# Patient Record
Sex: FEMALE | Race: WHITE | NOT HISPANIC OR LATINO | Employment: OTHER | URBAN - METROPOLITAN AREA
[De-identification: names, ages, dates, MRNs, and addresses within clinical notes are randomized per-mention and may not be internally consistent; named-entity substitution may affect disease eponyms.]

---

## 2019-08-14 DIAGNOSIS — K21.9 GASTROESOPHAGEAL REFLUX DISEASE, ESOPHAGITIS PRESENCE NOT SPECIFIED: ICD-10-CM

## 2019-08-14 DIAGNOSIS — R49.9 VOICE IMPAIRMENT: Primary | ICD-10-CM

## 2019-10-03 ENCOUNTER — CLINICAL SUPPORT (OUTPATIENT)
Dept: SPEECH THERAPY | Facility: HOSPITAL | Age: 64
End: 2019-10-03
Payer: COMMERCIAL

## 2019-10-03 ENCOUNTER — OFFICE VISIT (OUTPATIENT)
Dept: OTOLARYNGOLOGY | Facility: CLINIC | Age: 64
End: 2019-10-03
Payer: COMMERCIAL

## 2019-10-03 VITALS
DIASTOLIC BLOOD PRESSURE: 93 MMHG | WEIGHT: 210.13 LBS | HEART RATE: 59 BPM | TEMPERATURE: 98 F | SYSTOLIC BLOOD PRESSURE: 135 MMHG

## 2019-10-03 DIAGNOSIS — J38.5 LARYNGEAL SPASM: ICD-10-CM

## 2019-10-03 DIAGNOSIS — J38.3 VOCAL FOLD SCAR: ICD-10-CM

## 2019-10-03 DIAGNOSIS — R49.0 DYSPHONIA: Primary | ICD-10-CM

## 2019-10-03 DIAGNOSIS — K21.9 GASTROESOPHAGEAL REFLUX DISEASE, ESOPHAGITIS PRESENCE NOT SPECIFIED: ICD-10-CM

## 2019-10-03 DIAGNOSIS — R49.9 VOICE IMPAIRMENT: ICD-10-CM

## 2019-10-03 PROCEDURE — 31579 LARYNGOSCOPY TELESCOPIC: CPT | Mod: S$GLB,,, | Performed by: OTOLARYNGOLOGY

## 2019-10-03 PROCEDURE — 92524 BEHAVRAL QUALIT ANALYS VOICE: CPT | Mod: GN

## 2019-10-03 PROCEDURE — 99999 PR PBB SHADOW E&M-EST. PATIENT-LVL III: CPT | Mod: PBBFAC,,, | Performed by: OTOLARYNGOLOGY

## 2019-10-03 PROCEDURE — 31579 PR LARYNGOSCOPY, FLEX/RIGID TELESCOPIC, W/STROBOSCOPY: ICD-10-PCS | Mod: S$GLB,,, | Performed by: OTOLARYNGOLOGY

## 2019-10-03 PROCEDURE — 99203 OFFICE O/P NEW LOW 30 MIN: CPT | Mod: 25,S$GLB,, | Performed by: OTOLARYNGOLOGY

## 2019-10-03 PROCEDURE — 99203 PR OFFICE/OUTPT VISIT, NEW, LEVL III, 30-44 MIN: ICD-10-PCS | Mod: 25,S$GLB,, | Performed by: OTOLARYNGOLOGY

## 2019-10-03 PROCEDURE — 99999 PR PBB SHADOW E&M-EST. PATIENT-LVL III: ICD-10-PCS | Mod: PBBFAC,,, | Performed by: OTOLARYNGOLOGY

## 2019-10-03 RX ORDER — ESCITALOPRAM OXALATE 20 MG/1
20 TABLET ORAL DAILY
COMMUNITY

## 2019-10-03 RX ORDER — METOPROLOL SUCCINATE 25 MG/1
25 TABLET, EXTENDED RELEASE ORAL DAILY
COMMUNITY

## 2019-10-03 RX ORDER — CETIRIZINE HYDROCHLORIDE 10 MG/1
10 TABLET, CHEWABLE ORAL DAILY
COMMUNITY

## 2019-10-03 RX ORDER — CYANOCOBALAMIN (VITAMIN B-12) 2500 MCG
TABLET, SUBLINGUAL SUBLINGUAL
COMMUNITY

## 2019-10-03 RX ORDER — ASPIRIN 81 MG/1
81 TABLET ORAL DAILY
COMMUNITY

## 2019-10-03 RX ORDER — TRAZODONE HYDROCHLORIDE 50 MG/1
50 TABLET ORAL NIGHTLY
COMMUNITY

## 2019-10-03 RX ORDER — BACLOFEN 20 MG/1
20 TABLET ORAL 3 TIMES DAILY
COMMUNITY

## 2019-10-03 RX ORDER — GABAPENTIN 300 MG/1
300 CAPSULE ORAL 3 TIMES DAILY
COMMUNITY

## 2019-10-03 RX ORDER — MONTELUKAST SODIUM 10 MG/1
10 TABLET ORAL NIGHTLY
COMMUNITY

## 2019-10-03 RX ORDER — OXYCODONE AND ACETAMINOPHEN 7.5; 325 MG/1; MG/1
1 TABLET ORAL EVERY 4 HOURS PRN
COMMUNITY

## 2019-10-03 RX ORDER — LEVOTHYROXINE SODIUM 112 UG/1
112 TABLET ORAL DAILY
COMMUNITY

## 2019-10-03 RX ORDER — MECLIZINE HCL 12.5 MG 12.5 MG/1
12.5 TABLET ORAL 3 TIMES DAILY PRN
COMMUNITY

## 2019-10-03 RX ORDER — PRAVASTATIN SODIUM 40 MG/1
40 TABLET ORAL DAILY
COMMUNITY

## 2019-10-03 RX ORDER — OMEPRAZOLE 40 MG/1
40 CAPSULE, DELAYED RELEASE ORAL DAILY
COMMUNITY

## 2019-10-03 RX ORDER — NAPROXEN 500 MG/1
500 TABLET ORAL 2 TIMES DAILY
COMMUNITY

## 2019-10-03 NOTE — PATIENT INSTRUCTIONS
SULCUS   What is sulcus?   Sulcus literally means groove. A vocal fold sulcus is a grooce that forms in the vocal fold tissue. In some cases, the groove is very shallow and does not cause any major symptoms. True sulcus vocalis is a deep pit in the vocal fold tissue that can severely impair the voice. Common complaints include voice weakness, fatigue, and a jason or veiled sound.   Some cases of sulci may be caused by a ruptured cyst, while others may have been present from birth.     How is it treated?   Although voice therapy may improve some symptoms, it may not resolve the problem completely. In these cases, surgery may be considered.         MUSCLE TENSION DYSPHONIA     What is MTD?     Muscle tension dysphonia (MTD) is a condition in which laryngeal muscles are overactive, causing voice fatigue and discomfort with use. Sometimes MTD accompanies another organic condition, but it can also occur on its own. The overactivity of the muscles can result in incomplete closure of the vocal folds. Sometimes, the false vocal folds are overactive as well.     MTD is caused by over-activity in some laryngeal muscles, which is sometimes caused by the underactivity of other muscles. If there is an underlying condition, like nodules or an upper respiratory infection, the overcompensation to produce voice becomes a learned pattern.     How is MTD treated?     Voice therapy is the first line of treatment for MTD. There are a wide variety of treatment approaches, including laryngeal massage.                 WHAT TO EXPECT FROM VOICE THERAPY    Purpose  The purpose of voice therapy is to help you find a better, more efficient way to use your voice or to reduce symptoms such as coughing, throat clearing, or difficulty breathing.  Depending on your symptoms, you may learn how to produce clearer voice quality, how to reduce fatigue or pain associated with speaking, how to take care of your voice, and how to eliminate  chronic coughing or throat clearing.      Process: Evaluation  First, you may go through some initial testing.  In most cases, a videostroboscopy will be performed in order to allow your speech pathologist and your physician to look at your vocal cords to aid in deciding if you would benefit from voice therapy.  Next, you may work with the speech pathologist to assess the current capabilities of your voice.  Following evaluation, your speech pathologist will design a therapeutic plan to improve your voice as well as other symptoms that may bother you.  At the time of evaluation, your speech pathologist may provide you with exercises to try at home.      Process: Therapy  Most patients benefit from 2-8 sessions over 1-3 months.  Voice therapy involves changing the behavior of your vocal cords and speaking habits, so it is very important that you attend your appointments and do home practices as instructed by your speech pathologist.  Home practice and participation in therapy are critical to meeting your desired voice goals, so of course, we are able to work with you to schedule appointments that are convenient for you.          VOCAL HYGIENE AND HYDRATION RECOMMENDATIONS     DO   · Drink plenty of waterabout  oz a day! If your urine is pale, you should be well-hydrated.  Try some of these tips to increase hydration as well.  · Eat wet snacks such as grapes, melon, cucumbers, apple slices   · Reduce intake coffee and other caffeinated and carbonated beverages   · Suck on pastille lozenges or sugar free candies (not mentholated lozenges)   · Use a room or personal humidifier   · Use sinus rinses/irrigations or nasal saline spray   · Inhale steam for a few minutes before speaking or singing   · Pay attention to how, and how much, you use your voice.   · Give yourself vocal breaks throughout the day.   · Breathe when talking, take frequent pauses for breath.   · Use a microphone when speaking in front of a  group of 15 or more to reduce voice strain.   · Learn how to use your voice correctly to get loud with voice therapy techniques.  · Stay healthy. Eat right and get plenty of rest. Follow a reflux precaution diet if indicated.   ____________________________________________________________________    REDUCE   · Loud talking, yelling, cheering, or screaming. Voice injury can take place over a long time, or all at once.  If you need to talk loud or yell, be sure you are doing it properly.   · Clearing your throat and forceful coughing. The vocal folds collect mucus when irritated or inflamed and this can cause the urge to clear your throat. The trauma of clearing the throat creates more inflammation and mucus. See tips above for keeping hydrated to assist with cutting back on throat clearing.  Instead of clearing your throat, try these behaviors until the sensation passes:   · Take a sip of water   · Silently clear with a hard exhale making an hhh sound   · Swallow hard   · Drying agents such as anti-histamines or decongestant medications. You should always take medications as prescribed, but keep in mind these will dry you out, so increase your hydration!   ____________________________________________________________________    AVOID   · Inhalant irritants such as smoke, strong fumes, and allergens. Take advantage of 1-800-QUIT-NOW if you are ready to stop smoking.   · Unnecessary non-speech noises, such as grunting during exercise, loud noises, or excessively high- or low-pitched sounds. Save your voice for talking and singing!   · Whispering. Whispering places your vocal folds in a tenser position than usual.

## 2019-10-03 NOTE — PROGRESS NOTES
"[facility]  Department of Otorhinolaryngology and Communication Sciences    Diana Miner is a 64 y.o. female who [facility] for consultation at the kind request of Dr. Mesfin Servin for further management of hoarseness.     She complains of difficulty singing, "screechy" sound to the voice, throat irritation, hoarseness. Onset was gradual. Duration is about a year. Time course is intermittent; tends to be worse in the morning.  Symptoms are stable. Exacerbating factors include morning, talking a lot. She denies any alleviating factors. Associated symptoms include dyspnea and dysphagia.  Feels like she has to take smaller bites because food has a hard time going down.  Denies issues swallowing liquids.  Only sings in [employer] but quit the choir recently.  Also notices hoarseness in her speaking voice.  Today reports her voice is normal.      Has a cardiac stent - recently had a blockage which was treated and resolved dyspnea.  Now feeling some dyspnea returning.      Reports she has acid reflux and has been taking meds for several years.      Med changes over the last year: allergy, pain (changed over last 3 months)    Voice Handicap Index-10 (VHI-10) score is 15.   Reflux Symptom Index (RSI) score is 36.    Past Medical History  Cardiac - heart stent   GERD    Past Surgical History  4 back surgeries - spinal stenosis, floating disc, slipped disc, ruptured disc.  Currently has a fusion    Heart stent   2 rotator cuff  Torn meniscus  Total hysterectomy   Cystectomy from glute  Gastric sleeve    Family History  Her family history is not on file.    Social History  She reports that she has quit smoking. She has never used smokeless tobacco.    Allergies  She is allergic to clindamycin.    Medications  She has a current medication list which includes the following prescription(s): aspirin, baclofen, biotin, cetirizine, escitalopram oxalate, gabapentin, levothyroxine, meclizine, metoprolol " succinate, montelukast, naproxen, omeprazole, oxycodone-acetaminophen, pravastatin, and trazodone.    Review of Systems   Constitutional: Negative for fever.   HENT: Positive for sore throat.    Eyes: Negative for visual disturbance.   Respiratory: Positive for wheezing.    Cardiovascular: Negative for chest pain.   Gastrointestinal: Positive for nausea.   Musculoskeletal: Positive for arthralgias.   Skin: Negative for rash.   Neurological: Negative for tremors.   Hematological: Does not bruise/bleed easily.   Psychiatric/Behavioral: The patient is nervous/anxious.           Objective:     BP (!) 135/93   Pulse (!) 59   Temp 97.6 °F (36.4 °C)   Wt 95.3 kg (210 lb 1.6 oz)      Physical Exam    Constitutional: comfortable, well dressed  Psychiatric: appropriate affect  Respiratory: comfortably breathing, symmetric chest rise, no stridor  Voice: minimal variable roughness/strain  Cardiovascular: upper extremities non-edematous  Lymphatic: no cervical lymphadenopathy  Neurologic: alert and oriented to time, place, person, and situation; cranial nerves 3-12 grossly intact  Head: normocephalic  Eyes: conjunctivae and sclerae clear  Ears: normal pinnae, normal external auditory canals, tympanic membranes intact  Nose: mucosa pink and noncongested, no masses, no mucopurulence, no polyps  Oral cavity / oropharynx: no mucosal lesions  Neck: soft, full range of motion, laryngotracheal complex palpable with appropriate landmarks, larynx elevates on swallowing  Indirect laryngoscopy: limited due to gag    Procedure  Rigid Laryngeal Videostroboscopy (76108): Laryngeal videostroboscopy is indicated to assess the laryngeal vibratory biomechanics and vocal fold oscillation, which cannot be assessed with a plain light examination. This was carried out with a 70 degree endoscope. After verbal consent was obtained, the patient was positioned and the tongue was gently secured with a gauze sponge. The endoscope was passed transorally  and positioned to image the larynx and hypopharynx in detail. The following features were examined: laryngeal and hypopharyngeal masses; vocal fold range and symmetry of motion; laryngeal mucosal edema, erythema, inflammation, and hydration; salivary pooling; and gross laryngeal sensation. During phonation, the vocal folds were assessed for glottal closure; mucosal wave; vocal fold lesions; vibratory periodicity, amplitude, and phase symmetry; and vertical height match. The equipment was removed. The patient tolerated the procedure well without complication. All findings were normal except:  - mild superficial concavity of the free edge of the left true vocal fold  - complete glottal closure  - mild asymmetry of phase/amplitude of mucosal wave  - phonatory supraglottic hyperfunction      Assessment:     Diana Miner is a 64 y.o. 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       Plan:        I had a discussion with the patient regarding her condition and the further workup and management options.      The patient is reassured that these symptoms do not appear to represent a serious or threatening condition. SLP voice evaluation and subsequent therapy sessions are medically necessary for restoration of laryngeal function. She had her initial session in conjunction with this visit. I am optimistic for improvement with voice therapy. Further intervention which could be considered may include vocal fold injection augmentation.    She will follow up with me in the future on an as-needed basis.    All questions were answered, and the patient is in agreement with the above.     Prashanth Lo M.D.  [facility]  Department of Otorhinolaryngology and Communication Sciences

## 2019-10-03 NOTE — PROGRESS NOTES
"Referring provider: Dr. Mesfin Servin  Reason for visit: Behavioral and qualitative analysis of voice and resonance (CPT 22071)    Subjective / History    Diana Miner is a 64 y.o. female referred for voice evaluation ([facility] [address]) by Dr. Servin.  She presents with complaints of difficulty with singing, "screechy" sounding vocal quality when singing, and intermittent hoarseness when speaking which began one year ago.  The patient also endorses: voice worse in morning, voice worse in afternoon/evening, fatigue with use, difficulty with singing and tightness. She reports that her voice is normal today; however, she experiences vocal fatigue. She feels that sometimes she is straining when speaking, especially on the phone. She sings in her [employer] choir and describes previously having a "big [employer] voice." She quit her [employer] choir recently. She reports that she has less difficulty singing as a tenor; however, she was a soprano.    Swallowing: Difficulty swallowing solid foods. She reports having to take small bites of food to help food go down. She denies difficulty swallowing liquids.  Breathing: SOB    Smokin packs/day   Caffeine: 0 cups/day   Reflux: yes. Taking omeprazole 1 x per day.   Water: 60 oz/day     Stroboscopy findings (per Dr. Lo on 10/3/2019)  - mild superficial concavity of the free edge of the left true vocal fold  - complete glottal closure  - mild asymmetry of phase/amplitude of mucosal wave  - phonatory supraglottic hyperfunction    No past medical history on file.  Current Outpatient Medications on File Prior to Visit   Medication Sig Dispense Refill    aspirin (ECOTRIN) 81 MG EC tablet Take 81 mg by mouth once daily.      baclofen (LIORESAL) 20 MG tablet Take 20 mg by mouth 3 (three) times daily.      biotin 5,000 mcg Subl Place under the tongue.      cetirizine 10 mg chewable tablet Take 10 mg by mouth once daily.      escitalopram oxalate (LEXAPRO) 20 MG tablet Take 20 mg by mouth " once daily.      gabapentin (NEURONTIN) 300 MG capsule Take 300 mg by mouth 3 (three) times daily.      levothyroxine (SYNTHROID) 112 MCG tablet Take 112 mcg by mouth once daily.      meclizine (ANTIVERT) 12.5 mg tablet Take 12.5 mg by mouth 3 (three) times daily as needed.      metoprolol succinate (TOPROL-XL) 25 MG 24 hr tablet Take 25 mg by mouth once daily.      montelukast (SINGULAIR) 10 mg tablet Take 10 mg by mouth every evening.      naproxen (NAPROSYN) 500 MG tablet Take 500 mg by mouth 2 (two) times daily.      omeprazole (PRILOSEC) 40 MG capsule Take 40 mg by mouth once daily.      oxyCODONE-acetaminophen (PERCOCET) 7.5-325 mg per tablet Take 1 tablet by mouth every 4 (four) hours as needed for Pain.      pravastatin (PRAVACHOL) 40 MG tablet Take 40 mg by mouth once daily.      traZODone (DESYREL) 50 MG tablet Take 50 mg by mouth every evening.       No current facility-administered medications on file prior to visit.      Objective    Perceptual/behavioral assessment  -CAPE-V Overall Score: 18  -Quality: strained with high pitches and rough   -Volume: appropriate for age and gender  -Pitch: appropriate for age and gender identity  -Flexibility: diminished, instability  -Habitual respiratory pattern: chest/clavicular     Education / Stimulability Trials   Discussed importance of vocal hygiene including: hydration, conservation and reducing throat clearing, coughing, other phonotraumatic behaviors. Patient was stimulable for decreased roughness in modal pitch using straw phonation exercises. Good carryover to words, phrases, and structured reading passages. Patient most stimulable for decreased strain during melodic variations using cup bubble with straw SOVT exercises and resonant humming on /m/ resonance exercises. Patient with intermittent carryover to high pitches and transitions during melodic variations; slightly improved carryover when patient was cued to utilize yawn- sigh techniques.  Encouraged practicing exercises several times daily in isolation and into short phrases to solidify muscle memory patterns and reduce extralaryngeal tension during speech tasks. She was amenable to all suggestions.     Functional goals  Length Status Goal   Long term Initiated Patient and clinician will facilitate changes in vocal function in order to restore functional use of voice for daily occupational, social, and emotional demands.    Long term Initiated Patient will re-establish phonation with adequate balance of airflow and resonance with decreased muscle tension.    Long term Initiated Patient will maximize efficiency of the vocal mechanism relative to existing laryngeal disorder through coordinating subsystems of voice within 12 weeks as evidenced by patient report and SLP observations.   Long term Initiated Patient will improve coordination of respiration and phonation for efficient vocal production at a conversational level.    Short term Initiated Patient will coordinate vocal subsystems in hierarchical speech tasks by producing sound in an efficient manner yielding improved or normal voice quality and vocal endurance in the presence of existing laryngeal disorder with 90% accuracy independently.   Short term Initiated Patient will reduce vocal effort and fatigue by decreasing upper body tension as evidenced by a decrease in symptoms and lack of observable/palpable signs of hyperkinetic muscular behaviors.   Short term Initiated Patient will complete SOVT exercises and/or resonant-focused exercises 3-5x daily to strengthen and balance the intrinsic laryngeal musculature and maximize glottic closure without medial hyperfunction.   Short term Initiated Patient will identify the sensations associated with muscle relaxation in the abdominal, thoracic, neck and facial areas during efficient phonation with minimal clinician cue.    Short term Initiated Patient will demonstrate the ability to increase awareness  of voicing behavior through self-monitoring to facilitate generalization in functional speaking situations with 80% accuracy.      Assessment     Diana Miner presents with mild dysphonia. Diagnoses of Voice impairment and Gastroesophageal reflux disease, esophagitis presence not specified were pertinent to this visit. Prognosis for continued improvement is good.     Recommendations / POC    Recommend 2-4 sessions of voice/speech therapy over 4-6 weeks with a speech-language pathologist to optimize glottal postures for improved vocal function, vocal efficiency, and ease of phonation. She should continue the exercises as discussed in session and should contact me with any further questions.

## 2019-10-17 ENCOUNTER — TELEPHONE (OUTPATIENT)
Dept: SPEECH THERAPY | Facility: HOSPITAL | Age: 64
End: 2019-10-17

## 2023-01-30 ENCOUNTER — APPOINTMENT (RX ONLY)
Dept: URBAN - METROPOLITAN AREA CLINIC 179 | Facility: CLINIC | Age: 68
Setting detail: DERMATOLOGY
End: 2023-01-30

## 2023-01-30 DIAGNOSIS — L71.0 PERIORAL DERMATITIS: ICD-10-CM | Status: WORSENING

## 2023-01-30 DIAGNOSIS — L56.5 DISSEMINATED SUPERFICIAL ACTINIC POROKERATOSIS (DSAP): ICD-10-CM | Status: STABLE

## 2023-01-30 DIAGNOSIS — L82.1 OTHER SEBORRHEIC KERATOSIS: ICD-10-CM | Status: STABLE

## 2023-01-30 DIAGNOSIS — D22 MELANOCYTIC NEVI: ICD-10-CM | Status: STABLE

## 2023-01-30 PROBLEM — D22.5 MELANOCYTIC NEVI OF TRUNK: Status: ACTIVE | Noted: 2023-01-30

## 2023-01-30 PROCEDURE — ? COUNSELING

## 2023-01-30 PROCEDURE — 99203 OFFICE O/P NEW LOW 30 MIN: CPT

## 2023-01-30 PROCEDURE — ? TREATMENT REGIMEN

## 2023-01-30 PROCEDURE — ? PRESCRIPTION

## 2023-01-30 RX ORDER — MINOCYCLINE HYDROCHLORIDE 100 MG/1
CAPSULE ORAL
Qty: 60 | Refills: 5 | Status: ERX

## 2023-01-30 RX ORDER — MUPIROCIN 20 MG/G
OINTMENT TOPICAL
Qty: 22 | Refills: 2 | Status: ERX

## 2023-01-30 ASSESSMENT — LOCATION DETAILED DESCRIPTION DERM
LOCATION DETAILED: RIGHT DISTAL PRETIBIAL REGION
LOCATION DETAILED: LEFT INFERIOR MEDIAL MALAR CHEEK
LOCATION DETAILED: LEFT SUPERIOR MEDIAL UPPER BACK
LOCATION DETAILED: LEFT MID-UPPER BACK
LOCATION DETAILED: RIGHT NASOLABIAL FOLD

## 2023-01-30 ASSESSMENT — LOCATION ZONE DERM
LOCATION ZONE: FACE
LOCATION ZONE: TRUNK
LOCATION ZONE: LIP
LOCATION ZONE: LEG

## 2023-01-30 ASSESSMENT — LOCATION SIMPLE DESCRIPTION DERM
LOCATION SIMPLE: LEFT CHEEK
LOCATION SIMPLE: LEFT UPPER BACK
LOCATION SIMPLE: RIGHT LIP
LOCATION SIMPLE: RIGHT PRETIBIAL REGION

## 2023-01-30 ASSESSMENT — PAIN INTENSITY VAS: HOW INTENSE IS YOUR PAIN 0 BEING NO PAIN, 10 BEING THE MOST SEVERE PAIN POSSIBLE?: NO PAIN

## 2023-01-30 ASSESSMENT — INVESTIGATOR STATIC GLOBAL ASSESSMENT
IN YOUR EXPERIENCE, AMONG ALL PATIENTS YOU HAVE SEEN WITH THIS CONDITION, HOW SEVERE IS THIS PATIENT'S CONDITION?: MODERATE

## 2023-01-30 NOTE — PROCEDURE: TREATMENT REGIMEN
Detail Level: Zone
Otc Regimen: Cortisone cream-Apply bid for two weeks
Initiate Treatment: Minocycline 100mg-take one capsule by mouth bid\\nMupirocin-Apply twice daily to affect areas around mouth

## 2023-09-13 ENCOUNTER — APPOINTMENT (RX ONLY)
Dept: URBAN - METROPOLITAN AREA CLINIC 51 | Facility: CLINIC | Age: 68
Setting detail: DERMATOLOGY
End: 2023-09-13

## 2023-09-13 DIAGNOSIS — L71.0 PERIORAL DERMATITIS: ICD-10-CM | Status: INADEQUATELY CONTROLLED

## 2023-09-13 PROCEDURE — ? PRESCRIPTION

## 2023-09-13 PROCEDURE — ? COUNSELING

## 2023-09-13 PROCEDURE — 99213 OFFICE O/P EST LOW 20 MIN: CPT

## 2023-09-13 PROCEDURE — ? TREATMENT REGIMEN

## 2023-09-13 RX ORDER — MINOCYCLINE HYDROCHLORIDE 100 MG/1
CAPSULE ORAL
Qty: 60 | Refills: 5 | Status: ERX

## 2023-09-13 RX ORDER — METRONIDAZOLE 7.5 MG/G
CREAM TOPICAL
Qty: 45 | Refills: 3 | Status: ERX | COMMUNITY
Start: 2023-09-13

## 2023-09-13 RX ADMIN — METRONIDAZOLE 1: 7.5 CREAM TOPICAL at 00:00

## 2023-09-13 ASSESSMENT — LOCATION SIMPLE DESCRIPTION DERM
LOCATION SIMPLE: LEFT EYELID
LOCATION SIMPLE: RIGHT EYELID
LOCATION SIMPLE: RIGHT LIP
LOCATION SIMPLE: LEFT CHEEK

## 2023-09-13 ASSESSMENT — LOCATION DETAILED DESCRIPTION DERM
LOCATION DETAILED: RIGHT LATERAL CANTHUS
LOCATION DETAILED: LEFT MEDIAL BUCCAL CHEEK
LOCATION DETAILED: LEFT INFERIOR MEDIAL MALAR CHEEK
LOCATION DETAILED: RIGHT NASOLABIAL FOLD
LOCATION DETAILED: LEFT LATERAL CANTHUS

## 2023-09-13 ASSESSMENT — LOCATION ZONE DERM
LOCATION ZONE: LIP
LOCATION ZONE: FACE
LOCATION ZONE: EYELID

## 2023-09-13 NOTE — PROCEDURE: TREATMENT REGIMEN
Continue Regimen: Minocycline
Detail Level: Zone
Initiate Treatment: Metronidazole apply to affected area on face daily.

## 2023-10-03 ENCOUNTER — APPOINTMENT (RX ONLY)
Dept: URBAN - METROPOLITAN AREA CLINIC 179 | Facility: CLINIC | Age: 68
Setting detail: DERMATOLOGY
End: 2023-10-03

## 2023-10-03 DIAGNOSIS — D22 MELANOCYTIC NEVI: ICD-10-CM | Status: STABLE

## 2023-10-03 DIAGNOSIS — L82.1 OTHER SEBORRHEIC KERATOSIS: ICD-10-CM | Status: STABLE

## 2023-10-03 DIAGNOSIS — L81.4 OTHER MELANIN HYPERPIGMENTATION: ICD-10-CM | Status: STABLE

## 2023-10-03 DIAGNOSIS — L23.2 ALLERGIC CONTACT DERMATITIS DUE TO COSMETICS: ICD-10-CM | Status: INADEQUATELY CONTROLLED

## 2023-10-03 PROBLEM — D22.5 MELANOCYTIC NEVI OF TRUNK: Status: ACTIVE | Noted: 2023-10-03

## 2023-10-03 PROCEDURE — ? ADDITIONAL NOTES

## 2023-10-03 PROCEDURE — ? PRESCRIPTION

## 2023-10-03 PROCEDURE — ? COUNSELING

## 2023-10-03 PROCEDURE — 99213 OFFICE O/P EST LOW 20 MIN: CPT

## 2023-10-03 RX ORDER — HYDROCORTISONE 25 MG/G
1 CREAM TOPICAL BID
Qty: 20 | Refills: 2 | Status: ERX | COMMUNITY
Start: 2023-10-03

## 2023-10-03 RX ADMIN — HYDROCORTISONE 1: 25 CREAM TOPICAL at 00:00

## 2023-10-03 ASSESSMENT — LOCATION ZONE DERM
LOCATION ZONE: ARM
LOCATION ZONE: TRUNK
LOCATION ZONE: EYELID
LOCATION ZONE: LEG

## 2023-10-03 ASSESSMENT — LOCATION DETAILED DESCRIPTION DERM
LOCATION DETAILED: SUPERIOR THORACIC SPINE
LOCATION DETAILED: LEFT MEDIAL INFERIOR EYELID
LOCATION DETAILED: RIGHT ANTERIOR SHOULDER
LOCATION DETAILED: RIGHT PROXIMAL PRETIBIAL REGION
LOCATION DETAILED: LEFT ANTERIOR SHOULDER
LOCATION DETAILED: RIGHT ANTERIOR DISTAL THIGH
LOCATION DETAILED: RIGHT MEDIAL INFERIOR EYELID

## 2023-10-03 ASSESSMENT — PAIN INTENSITY VAS: HOW INTENSE IS YOUR PAIN 0 BEING NO PAIN, 10 BEING THE MOST SEVERE PAIN POSSIBLE?: NO PAIN

## 2023-10-03 ASSESSMENT — SEVERITY ASSESSMENT 2020: SEVERITY 2020: MILD

## 2023-10-03 ASSESSMENT — LOCATION SIMPLE DESCRIPTION DERM
LOCATION SIMPLE: LEFT INFERIOR EYELID
LOCATION SIMPLE: RIGHT THIGH
LOCATION SIMPLE: RIGHT SHOULDER
LOCATION SIMPLE: UPPER BACK
LOCATION SIMPLE: RIGHT INFERIOR EYELID
LOCATION SIMPLE: LEFT SHOULDER
LOCATION SIMPLE: RIGHT PRETIBIAL REGION

## 2023-10-03 ASSESSMENT — BSA RASH: BSA RASH: 3

## 2023-10-03 ASSESSMENT — ITCH NUMERIC RATING SCALE: HOW SEVERE IS YOUR ITCHING?: 2

## 2023-10-03 NOTE — PROCEDURE: ADDITIONAL NOTES
Detail Level: Simple
Render Risk Assessment In Note?: no
Additional Notes: Patient instructed to stop using her current eye cream and make up remover.

## 2024-03-18 NOTE — LETTER
October 3, 2019      Mesfin Servin MD  [address] [facility] [place] [address]           [facility]  [address] [address]  [facility] [address]  Phone: [phone]  Fax: [phone]          Patient: Diana Miner   MR Number: [id number]   YOB: 1955   Date of Visit: 10/3/2019       Dear Dr. Mesfin Servin:    Thank you for referring Diana Miner to me for evaluation. Attached you will find relevant portions of my assessment and plan of care.    If you have questions, please do not hesitate to call me. I look forward to following Diana Miner along with you.    Sincerely,    Prashanth Lo MD    Enclosure  CC:  No Recipients    If you would like to receive this communication electronically, please contact [email or web address] or [phone] to request more information on [employer] access.    For providers and/or their staff who would like to refer a patient to [facility], please contact us through our one-stop-shop provider referral line, [facility], at [phone].    If you feel you have received this communication in error or would no longer like to receive these types of communications, please e-mail [email or web address]          independent